# Patient Record
Sex: FEMALE | Race: WHITE | NOT HISPANIC OR LATINO | Employment: FULL TIME | URBAN - METROPOLITAN AREA
[De-identification: names, ages, dates, MRNs, and addresses within clinical notes are randomized per-mention and may not be internally consistent; named-entity substitution may affect disease eponyms.]

---

## 2024-11-13 ENCOUNTER — APPOINTMENT (OUTPATIENT)
Dept: URGENT CARE | Age: 44
End: 2024-11-13
Attending: EMERGENCY MEDICINE

## 2025-03-02 ENCOUNTER — HOSPITAL ENCOUNTER (EMERGENCY)
Facility: HOSPITAL | Age: 45
Discharge: HOME | End: 2025-03-02
Attending: EMERGENCY MEDICINE
Payer: COMMERCIAL

## 2025-03-02 VITALS
OXYGEN SATURATION: 95 % | TEMPERATURE: 98 F | HEIGHT: 65 IN | DIASTOLIC BLOOD PRESSURE: 75 MMHG | BODY MASS INDEX: 30.82 KG/M2 | RESPIRATION RATE: 17 BRPM | HEART RATE: 82 BPM | WEIGHT: 185 LBS | SYSTOLIC BLOOD PRESSURE: 117 MMHG

## 2025-03-02 DIAGNOSIS — M54.32 LEFT SIDED SCIATICA: Primary | ICD-10-CM

## 2025-03-02 PROCEDURE — 99283 EMERGENCY DEPT VISIT LOW MDM: CPT

## 2025-03-02 PROCEDURE — 99282 EMERGENCY DEPT VISIT SF MDM: CPT

## 2025-03-02 RX ORDER — TIZANIDINE 2 MG/1
2 TABLET ORAL EVERY 8 HOURS PRN
COMMUNITY

## 2025-03-02 RX ORDER — METHYLPREDNISOLONE 4 MG/1
4 TABLET ORAL AS DIRECTED
COMMUNITY

## 2025-03-02 RX ORDER — IBUPROFEN 200 MG
200 TABLET ORAL ONCE
Status: DISCONTINUED | OUTPATIENT
Start: 2025-03-02 | End: 2025-03-02

## 2025-03-02 RX ORDER — ACETAMINOPHEN 500 MG
500 TABLET ORAL ONCE
Status: DISCONTINUED | OUTPATIENT
Start: 2025-03-02 | End: 2025-03-02

## 2025-03-02 NOTE — ED INITIAL ASSESSMENT (HPI)
Patient to ED c/o ongoing left sided sciatica plain. Patient was seen at [facility] and prescribed steroids and muscle relaxers with minimal relief. Patient states early this morning took an old norco with relief in symptoms.

## 2025-03-02 NOTE — ED PROVIDER NOTES
Patient Seen in: [facility] Department      History     Chief Complaint   Patient presents with    Back Pain     Stated Complaint: back pain, difficulty using bathroom    Subjective:   HPI    44-year-old female presents to the ER complaining having static discomfort involving her left buttock.  When she sits for long period time it hurts so it makes it difficult for her to sit on the toilet when she goes to the bathroom.  She denies any loss of bowel or bladder control.  No loss of sensation in the groin.  She took an old Norco tablet and gave her some pain relief.  Reviewing her record she was seen for an office visit yesterday for left sciatic pain on the date of 3/1/2025.  She has had sciatica in the past was seen on 12/10/2024 as well as on 11/15/2024 for sciatica.  She was not written for any pain medications on her most recent visit.  She was only written for steroids and muscle relaxants.      Objective:     History reviewed. No pertinent past medical history.           Past Surgical History:   Procedure Laterality Date    Hc  section level i                  Social History     Socioeconomic History    Marital status:    Tobacco Use    Smoking status: Never    Smokeless tobacco: Never   Vaping Use    Vaping status: Never Used   Substance and Sexual Activity    Alcohol use: Not Currently    Drug use: Never     Social Drivers of Health     Food Insecurity: Unknown (2024)    Received from [facility]    Food Insecurity     Have there been times that your food ran out, and you didn't have money to get more?: Prefer Not to Answer     Are there times that you worry that this might happen?: Prefer Not to Answer   Transportation Needs: Unknown (2024)    Received from [facility]    Transportation Needs     Do you have trouble getting transportation to medical appointments?: Prefer Not to Answer     How do you normally get to and from  your appointments?: Family/Friend                  Physical Exam     ED Triage Vitals [03/02/25 1126]   /72   Pulse 92   Resp 16   Temp 98.1 °F (36.7 °C)   Temp src Temporal   SpO2 96 %   O2 Device None (Room air)       Current Vitals:   Vital Signs  BP: 126/72  Pulse: 92  Resp: 16  Temp: 98.1 °F (36.7 °C)  Temp src: Temporal    Oxygen Therapy  SpO2: 96 %  O2 Device: None (Room air)        Physical Exam  General: This a pleasant nontoxic appearing patient in no apparent distress alert and oriented ×3  HEENT: Pupils are equal reactive to light.  Extra ocular motions are intact.  No scleral icterus or conjunctival pallor.   Lungs: Clear to auscultation bilaterally.  No wheezes, rhonchi, or rales appreciated.  No accessory muscle use noted for breathing.  Cardiac: Regular rate and rhythm.  Normal S1 and 2 without murmurs or ectopy appreciated  Abdomen: Soft on examination without tenderness to deep palpation or to percussion.  No masses appreciated.  Bowel sounds are normoactive.  No CVA tenderness.  Extremities: No cyanosis, no edema or clubbing.  Pulses are +2.  Full range of motion is noted of the extremities without deformities.  No tenderness.  Neurologically intact.  Negative straight leg raise.  Reproducible discomfort on palpation over the left sciatic notch.    ED Course   Labs Reviewed - No data to display                MDM    Differential diagnose includes but is not limited to cord compression, sciatica, peripheral neuropathy.  The patient appears have sciatica.  She had good relief when she took Norco.  I offered the patient Norco for home but she initially declined Norco and I gave her a good alternative which is to take combination of ibuprofen and Tylenol together for pain management.  The patient's  came to the ER the patient then changed her mind and wanted Norco.  I explained to the patient and her  that Norco can be an option for pain management but if she would like something  that is nonnarcotic she can also take the combination of ibuprofen and Tylenol which has been shown in studies to have good pain relief with better efficacy then taking narcotic agents for pain management.  The patient then went back and wish to take the ibuprofen and Tylenol for pain management.  Encouraged to follow-up with neurology as an outpatient but return to the ER if symptoms progress or worsen such as loss of bowel or bladder control, foot drop, intractable pain.  With the patient not having significant neurologic findings at this time imaging was not undertaken.  She can follow-up as an outpatient for imaging.  Return if symptoms progress or worsen.  Note to Patient  The 21st Century Cures Act makes medical notes like these available to patients in the interest of transparency. However, be advised this is a medical document and is intended as [id number] communication; it is written in medical language and may appear blunt, direct, or contain abbreviations or verbiage that are unfamiliar. Medical documents are intended to carry relevant information, facts as evident, and the clinical opinion of the practitioner.  Patient was evaluated and a screening exam was performed.   As a treating physician attending to the patient, I determined, within reasonable clinical confidence and prior to discharge, that an emergency medical condition was not or was no longer present.  There was no indication for further evaluation, treatment or admission on an emergency basis.  Comprehensive verbal and written discharge and follow-up instructions were provided to help prevent relapse or worsening.  Patient was instructed to follow-up with their primary care provider for further evaluation and treatment, but to return immediately to the ER for worsening, concerning, new, changing or persisting symptoms.  I discussed the case with the patient and they had no questions, complaints, or concerns.  Patient felt comfortable going  home.  ^^Please note that this report has been produced using speech recognition software and may contain errors related to that system including, but not limited to, errors in grammar, punctuation, and spelling, as well as words and phrases that possibly may have been recognized inappropriately.  If there are any questions or concerns, contact the dictating provider for clarification.    Medical Decision Making      Disposition and Plan     Clinical Impression:  1. Left sided sciatica         Disposition:  There is no disposition on file for this visit.  There is no disposition time on file for this visit.    Follow-up:  Edel Sneed,   [address] [facility] [address]  [facility] [id number] [phone]    Schedule an appointment as soon as possible for a visit in 2 day(s)      Beverly Chambers MD  [address] [facility] [address]  [facility] [address] [phone]    Schedule an appointment as soon as possible for a visit in 1 week(s)            Medications Prescribed:  Current Discharge Medication List              Supplementary Documentation: